# Patient Record
Sex: FEMALE | Race: WHITE | Employment: UNEMPLOYED | URBAN - METROPOLITAN AREA
[De-identification: names, ages, dates, MRNs, and addresses within clinical notes are randomized per-mention and may not be internally consistent; named-entity substitution may affect disease eponyms.]

---

## 2020-12-18 ENCOUNTER — HOSPITAL ENCOUNTER (OUTPATIENT)
Dept: GENERAL RADIOLOGY | Age: 50
Discharge: HOME | End: 2020-12-18
Payer: MEDICAID

## 2020-12-18 ENCOUNTER — TRANSCRIBE ORDER (OUTPATIENT)
Dept: GENERAL RADIOLOGY | Age: 50
End: 2020-12-18

## 2020-12-18 DIAGNOSIS — M54.2 CERVICALGIA: ICD-10-CM

## 2020-12-18 DIAGNOSIS — M54.2 CERVICALGIA: Primary | ICD-10-CM

## 2020-12-18 PROCEDURE — 72040 X-RAY EXAM NECK SPINE 2-3 VW: CPT

## 2021-08-05 ENCOUNTER — TRANSCRIBE ORDER (OUTPATIENT)
Dept: SCHEDULING | Age: 51
End: 2021-08-05

## 2021-08-05 DIAGNOSIS — Z12.31 VISIT FOR SCREENING MAMMOGRAM: Primary | ICD-10-CM

## 2024-04-11 ENCOUNTER — TELEPHONE (OUTPATIENT)
Age: 54
End: 2024-04-11

## 2024-04-11 ENCOUNTER — OFFICE VISIT (OUTPATIENT)
Age: 54
End: 2024-04-11

## 2024-04-11 VITALS
HEIGHT: 66 IN | HEART RATE: 75 BPM | DIASTOLIC BLOOD PRESSURE: 75 MMHG | RESPIRATION RATE: 18 BRPM | TEMPERATURE: 98.6 F | WEIGHT: 140 LBS | SYSTOLIC BLOOD PRESSURE: 112 MMHG | BODY MASS INDEX: 22.5 KG/M2 | OXYGEN SATURATION: 91 %

## 2024-04-11 DIAGNOSIS — L72.0 EPIDERMAL INCLUSION CYST: Primary | ICD-10-CM

## 2024-04-11 ASSESSMENT — PATIENT HEALTH QUESTIONNAIRE - PHQ9
SUM OF ALL RESPONSES TO PHQ QUESTIONS 1-9: 0
SUM OF ALL RESPONSES TO PHQ QUESTIONS 1-9: 0
SUM OF ALL RESPONSES TO PHQ9 QUESTIONS 1 & 2: 0
SUM OF ALL RESPONSES TO PHQ QUESTIONS 1-9: 0
1. LITTLE INTEREST OR PLEASURE IN DOING THINGS: NOT AT ALL
SUM OF ALL RESPONSES TO PHQ QUESTIONS 1-9: 0
2. FEELING DOWN, DEPRESSED OR HOPELESS: NOT AT ALL

## 2024-04-11 NOTE — PROGRESS NOTES
Identified pt with two pt identifiers (name and ). Reviewed chart in preparation for visit and have obtained necessary documentation.    Marcy Rehman is a 53 y.o. female  Chief Complaint   Patient presents with    New Patient     Cyst on back      /75 (Site: Left Upper Arm, Position: Sitting, Cuff Size: Small Adult)   Pulse 75   Temp 98.6 °F (37 °C) (Oral)   Resp 18   Ht 1.676 m (5' 6\")   Wt 63.5 kg (140 lb)   SpO2 91%   BMI 22.60 kg/m²     1. Have you been to the ER, urgent care clinic since your last visit?  Hospitalized since your last visit?no    2. Have you seen or consulted any other health care providers outside of the [facility] since your last visit?  Include any pap smears or colon screening. no

## 2024-04-11 NOTE — PROGRESS NOTES
[facility]  Marilu Gao MD  [address] [place] [address], [place] [address] [phone]      Patient Name: Marcy Rehman (53 y.o., female)    Patient Address: [id number] [place] [id number]    PCP: Jovita Sherman FNP     Patient contact numbers:  [unfilled] [unfilled]       Chief Complaint   Patient presents with    New Patient     Cyst on back         History of Present Illness  53 year old female who presents with mass on her mid back for several years. It initially increased in size but has remained in size the last few years. SHe says its not painful. She denies drainage from the area, infection or trauma. History of similar symptoms in other area of her back and body that required excision.      Past Medical History:  denies    Past Surgical History:   Procedure Laterality Date    ABSCESS DRAINAGE      on back    HYSTERECTOMY (CERVIX STATUS UNKNOWN)      LUMBAR DISC SURGERY  2021       Family History:  HTN  Hypercholeseterolemia  COPD    Social History     Tobacco Use    Smoking status: Every Day     Types: Cigarettes   Rare alcohol  No illicit drug use    Allergies   Allergen Reactions    Aspirin-Caffeine        Current Outpatient Medications   Medication Sig Dispense Refill    sulfamethoxazole-trimethoprim (BACTRIM DS;SEPTRA DS) 800-160 MG per tablet Take 1 tablet by mouth 2 times daily for 5 days 10 tablet 0    Multiple Vitamin (MULTIVITAMIN ADULT PO) Take 1 gum by mouth daily       No current facility-administered medications for this visit.        Review of Systems   Constitutional: Negative.    HENT: Negative.     Eyes: Negative.    Respiratory: Negative.     Cardiovascular: Negative.    Gastrointestinal: Negative.    Endocrine: Negative.    Genitourinary: Negative.    Musculoskeletal: Negative.    Skin: Negative.    Allergic/Immunologic: Negative.    Neurological: Negative.    Hematological: Negative.

## 2024-04-12 ENCOUNTER — TELEPHONE (OUTPATIENT)
Age: 54
End: 2024-04-12

## 2024-04-12 RX ORDER — SULFAMETHOXAZOLE AND TRIMETHOPRIM 800; 160 MG/1; MG/1
1 TABLET ORAL 2 TIMES DAILY
Qty: 10 TABLET | Refills: 0 | Status: SHIPPED | OUTPATIENT
Start: 2024-04-12 | End: 2024-04-17

## 2024-04-12 NOTE — TELEPHONE ENCOUNTER
Patient called today regarding her appointment yesterday. She thought that Dr. Gao was going to call in some antibotics to [facility] on the [address]. Her phone number is [phone]

## 2024-04-12 NOTE — TELEPHONE ENCOUNTER
I called and informed the patient that the doctor sent a script to her pharmacy. She acknowledged understanding and thanked me for the call.

## 2024-04-13 ASSESSMENT — ENCOUNTER SYMPTOMS
EYES NEGATIVE: 1
GASTROINTESTINAL NEGATIVE: 1
RESPIRATORY NEGATIVE: 1
ALLERGIC/IMMUNOLOGIC NEGATIVE: 1

## 2024-04-25 ENCOUNTER — OFFICE VISIT (OUTPATIENT)
Age: 54
End: 2024-04-25

## 2024-04-25 VITALS
BODY MASS INDEX: 22.98 KG/M2 | HEIGHT: 66 IN | HEART RATE: 77 BPM | SYSTOLIC BLOOD PRESSURE: 134 MMHG | TEMPERATURE: 98 F | WEIGHT: 143 LBS | RESPIRATION RATE: 16 BRPM | DIASTOLIC BLOOD PRESSURE: 83 MMHG | OXYGEN SATURATION: 95 %

## 2024-04-25 DIAGNOSIS — L72.0 EPIDERMAL INCLUSION CYST: Primary | ICD-10-CM

## 2024-04-25 PROCEDURE — 99024 POSTOP FOLLOW-UP VISIT: CPT | Performed by: SURGERY

## 2024-04-25 ASSESSMENT — PATIENT HEALTH QUESTIONNAIRE - PHQ9
2. FEELING DOWN, DEPRESSED OR HOPELESS: NOT AT ALL
SUM OF ALL RESPONSES TO PHQ QUESTIONS 1-9: 0
1. LITTLE INTEREST OR PLEASURE IN DOING THINGS: NOT AT ALL
SUM OF ALL RESPONSES TO PHQ9 QUESTIONS 1 & 2: 0
SUM OF ALL RESPONSES TO PHQ QUESTIONS 1-9: 0

## 2024-04-25 NOTE — PROGRESS NOTES
Identified pt with two pt identifiers (name and ). Reviewed chart in preparation for visit and have obtained necessary documentation.    Marcy Rehman is a 53 y.o. female  Chief Complaint   Patient presents with    Other     wound check and suture removal       /83 (Site: Left Upper Arm, Position: Sitting, Cuff Size: Medium Adult)   Pulse 77   Temp 98 °F (36.7 °C) (Oral)   Resp 16   Ht 1.676 m (5' 6\")   Wt 64.9 kg (143 lb)   SpO2 95%   BMI 23.08 kg/m²     1. Have you been to the ER, urgent care clinic since your last visit?  Hospitalized since your last visit?no    2. Have you seen or consulted any other health care providers outside of the [facility] since your last visit?  Include any pap smears or colon screening. no

## 2024-05-01 ENCOUNTER — TELEPHONE (OUTPATIENT)
Age: 54
End: 2024-05-01

## 2024-05-01 RX ORDER — AMOXICILLIN AND CLAVULANATE POTASSIUM 875; 125 MG/1; MG/1
1 TABLET, FILM COATED ORAL 2 TIMES DAILY
Qty: 14 TABLET | Refills: 0 | Status: SHIPPED | OUTPATIENT
Start: 2024-05-01 | End: 2024-05-08

## 2024-05-01 RX ORDER — DOXYCYCLINE HYCLATE 100 MG
100 TABLET ORAL 2 TIMES DAILY
Qty: 14 TABLET | Refills: 0 | Status: SHIPPED | OUTPATIENT
Start: 2024-05-01 | End: 2024-05-08

## 2024-05-01 NOTE — TELEPHONE ENCOUNTER
Patient is unaware that she is supposed to be taking 2 antibiotics.. is she supposed to be taking them? Give her a call.

## 2024-05-01 NOTE — PROGRESS NOTES
[facility]      Clinic Note - Follow up    Subjective     Marcy Rehman returns for scheduled follow up today.  She underwent excision of epidermal inclusion cyst of her back 2 weeks ago on 4/11/2024 and presents for follow-up.  She is doing very well she does not have any pain in the area.  She does not notice any drainage from the area she denies fever or chills.    Objective     /83 (Site: Left Upper Arm, Position: Sitting, Cuff Size: Medium Adult)   Pulse 77   Temp 98 °F (36.7 °C) (Oral)   Resp 16   Ht 1.676 m (5' 6\")   Wt 64.9 kg (143 lb)   SpO2 95%   BMI 23.08 kg/m²       PE  GEN - Awake, alert, communicating appropriately.  NAD  Pulm - NWAB  CV - RRR  Abd - soft, NT, ND.   Back incision site no erythema no drainage no cellulitis sutures removed but wound not completely healed        Assessment     Marcy Rehman is a 53 y.o.yr old female status post excision of epidermal inclusion cyst of the back.  Doing well wound not completely healed    Plan     Sutures removed  Follow-up in 1 week for wound check to ensure the entire wound has healed      Marilu Gao MD  5/1/2024    15 mins of time was spent with the patient including reviewing chart, history and physical examination, reviewing labs and imaging and discussing treatment plan with patient and their family.

## 2024-05-01 NOTE — TELEPHONE ENCOUNTER
I spoke with  she states to check with the patient and see if she is having any drainage or anything. She states she will also discuss with her tomorrow at her follow up appointment. I tried to call the patient but her name was not on the voicemail so was unable to leave a message.

## 2024-05-02 ENCOUNTER — OFFICE VISIT (OUTPATIENT)
Age: 54
End: 2024-05-02

## 2024-05-02 VITALS
SYSTOLIC BLOOD PRESSURE: 112 MMHG | HEIGHT: 66 IN | HEART RATE: 87 BPM | RESPIRATION RATE: 15 BRPM | OXYGEN SATURATION: 95 % | BODY MASS INDEX: 22.31 KG/M2 | WEIGHT: 138.8 LBS | TEMPERATURE: 99.1 F | DIASTOLIC BLOOD PRESSURE: 82 MMHG

## 2024-05-02 DIAGNOSIS — L72.0 EPIDERMAL INCLUSION CYST: Primary | ICD-10-CM

## 2024-05-02 DIAGNOSIS — Z87.2 H/O EXCISION OF EPIDERMAL INCLUSION CYST: ICD-10-CM

## 2024-05-02 DIAGNOSIS — Z98.890 H/O EXCISION OF EPIDERMAL INCLUSION CYST: ICD-10-CM

## 2024-05-02 PROCEDURE — 99024 POSTOP FOLLOW-UP VISIT: CPT | Performed by: SURGERY

## 2024-05-02 NOTE — PROGRESS NOTES
Identified pt with two pt identifiers (name and ). Reviewed chart in preparation for visit and have obtained necessary documentation.    Marcy Rehman is a 53 y.o. female  Chief Complaint   Patient presents with    Follow-up    Wound Check     /82 (Site: Left Upper Arm, Position: Sitting, Cuff Size: Small Adult)   Pulse 87   Temp 99.1 °F (37.3 °C) (Oral)   Resp 15   Ht 1.676 m (5' 6\")   Wt 63 kg (138 lb 12.8 oz)   SpO2 95%   BMI 22.40 kg/m²     1. Have you been to the ER, urgent care clinic since your last visit?  Hospitalized since your last visit?NO    2. Have you seen or consulted any other health care providers outside of the [facility] since your last visit?  Include any pap smears or colon screening. NO

## 2025-02-04 ENCOUNTER — TRANSCRIBE ORDERS (OUTPATIENT)
Facility: HOSPITAL | Age: 55
End: 2025-02-04

## 2025-02-04 DIAGNOSIS — Z12.31 ENCOUNTER FOR SCREENING MAMMOGRAM FOR MALIGNANT NEOPLASM OF BREAST: Primary | ICD-10-CM
